# Patient Record
Sex: FEMALE | Race: BLACK OR AFRICAN AMERICAN
[De-identification: names, ages, dates, MRNs, and addresses within clinical notes are randomized per-mention and may not be internally consistent; named-entity substitution may affect disease eponyms.]

---

## 2018-04-18 NOTE — RAD
INDICATION:  Vaginal bleeding for 2 weeks.



COMPARISON:  There are no prior studies available for comparison.



TECHNIQUE:  Multiple real-time transvaginal images of the pelvis were obtained.



FINDINGS: The uterus is retroverted and normal in size and shape.  The uterus measured 6.6

x 3.5 x 4.3 cm.  The endometrial echo measured 0.9 cm in thickness.



The right ovary measured 5.7 x 4.8 x 4.6 cm.  The left ovary measured 2.9 x 2.2 x 1.9 cm.

There is vascular flow within both ovaries. There is a 3.9 x 4.1 x 3.7 cm simple appearing

right ovarian cyst.



There is a small amount of free intraperitoneal fluid in the cul-de-sac.



IMPRESSION:  2.9 CM RIGHT OVARIAN CYST LIKELY REPRESENTING A PHYSIOLOGIC CYST.

## 2018-04-18 NOTE — ED
GI/ HPI





- HPI Summary


HPI Summary: 


18 female presents to ED with complaints of "having my period for ~14 days". 

States she has been bleeding on and off. States she has had irregular cycle in 

the past however does not recall ever having it last as long as this. PMHx 

significant for PCOS. She does not take any medication, no birth control. 

Denies any abdominal pain, urinary symptoms or genitalia symptoms. No concern 

for vaginal discharge, itching, infection or STD concern. Denies concern for 

pregnancy. No fever/chills, nausea of vomiting. Denies lightheadedness, 

decreased appetite or other symptoms. No medications. Normal bowel movements. 

States she soaks through a ultra tampon over 4-5 hours.


 





- History of Current Complaint


Chief Complaint: EDOBProblems


Time Seen by Provider: 04/18/18 18:44


Stated Complaint: VAGINAL BLEEDING


Hx Obtained From: Patient


Onset/Duration: Started Weeks Ago - 2, Still Present


Timing: Constant


Severity: Mild


Current Severity: Mild


Vaginal Bleeding Description: Brownish-Red


Number of Pads per Hour: 1


Pain Intensity: 0


Location of Pain: None - Physical


Pain Characteristics: Other: - none


Associated Signs and Symptoms: Positive: Negative


Additional Signs & Symptoms: Positive: Vaginal Bleeding


Aggravating Factor(s): Nothing


Alleviating Factor(s): Nothing





- Allergy/Home Medications


Allergies/Adverse Reactions: 


 Allergies











Allergy/AdvReac Type Severity Reaction Status Date / Time


 


metformin Allergy  Unknown Verified 04/18/18 19:09





   Reaction  





   Details  














PMH/Surg Hx/FS Hx/Imm Hx


Endocrine/Hematology History: 


   Denies: Hx Anticoagulant Therapy, Hx Diabetes


Cardiovascular History: 


   Denies: Hx Hypertension


Respiratory History: 


   Denies: Hx Asthma


Psychiatric History: 


   Denies: Hx Eating Disorder





- Surgical History


Surgery Procedure, Year, and Place: none





- Immunization History


Immunizations Up to Date: Yes


Infectious Disease History: No


Infectious Disease History: 


   Denies: Traveled Outside the [place] in Last 30 Days





- Family History


Known Family History: Positive: None





- Social History


Alcohol Use: None


Hx Substance Use: Yes


Substance Use Type: Reports: Marijuana


Hx Tobacco Use: No


Smoking Status (MU): Never Smoked Tobacco





Review of Systems


Constitutional: Negative


Cardiovascular: Negative


Respiratory: Negative


Gastrointestinal: Negative


Positive: see HPI, other - vaginal bleeding


Musculoskeletal: Negative


Neurological: Negative


All Other Systems Reviewed And Are Negative: Yes





Physical Exam


Triage Information Reviewed: Yes


Vital Signs On Initial Exam: 


 Initial Vitals











Temp Pulse Resp BP Pulse Ox


 


 98.5 F   95   18   124/108   99 


 


 04/18/18 16:01  04/18/18 16:01  04/18/18 16:01  04/18/18 16:01  04/18/18 16:01








/90


Vital Signs Reviewed: Yes


Appearance: Positive: Well-Appearing, No Pain Distress, Well-Nourished


Skin: Positive: Warm, Skin Color Reflects Adequate Perfusion, Dry.  Negative: 

Cold, Numb, Cyanosis @, Pale, Erythema @


Head/Face: Positive: Normal Head/Face Inspection


Eyes: Positive: Conjunctiva Clear


ENT: Positive: Hearing grossly normal


Neck: Positive: Supple


Respiratory/Lung Sounds: Positive: Clear to Auscultation, Breath Sounds 

Present.  Negative: Rales, Rhonchi, Wheezes


Cardiovascular: Positive: Normal, RRR, Pulses are Symmetrical in both Upper and 

Lower Extremities.  Negative: Murmur, Rub


Abdomen Description: Positive: Nontender, No Organomegaly, Soft.  Negative: 

Bruit, CVA Tenderness (R), CVA Tenderness (L), Distended, Guarding, McBurney's 

Point Tenderness, Peritoneal Signs


Bowel Sounds: Positive: Present


Pelvic Exam: Positive: External Exam Normal - per patient, deferred exam, 

Active Bleeding


Musculoskeletal: Positive: Normal, Strength/ROM Intact


Neurological: Positive: Normal, Sensory/Motor Intact, Alert, Oriented to Person 

Place, Time, Normal Gait





Diagnostics





- Vital Signs


 Vital Signs











  Temp Pulse Resp BP Pulse Ox


 


 04/18/18 16:01  98.5 F  95  18  124/108  99














- Laboratory


Result Diagrams: 


 04/18/18 19:10





 04/18/18 19:10


Lab Statement: Any lab studies that have been ordered have been reviewed, and 

results considered in the medical decision making process.





- Ultrasound


  ** No standard instances


Ultrasound Interpretation: Positive (See Comments) - 2.9 CM RIGHT OVARIAN CYST 

LIKELY REPRESENTING A PHYSIOLOGIC CYST.


Ultrasound Interpretation Completed By: Radiologist





Re-Evaluation





- Re-Evaluation


  ** First Eval


Re-Evaluation Time: 20:00


Change: Improved - feeling fine and asymptomatic updated on any imaging and lab 

results agrees with plan and ready to be discharged





GIGU Course/Dx





- Course


Course Of Treatment: Labs obtained and unremarkable.  HCG obtained and 

negative.  Patient deferred pelvic exam.  Appears to be experiencing 

menorrhagia.  Ultrasound obtained showing right ovarian cyst without 

complication.  Follow-up with OB/GYN.  Did explain oral contraceptives 

sometimes can help irregular cycles.  Did also discuss complications of PCOS and

/or ovarian cyst.  Patient however does not want to take birth control.  

Increase fluid intake and eat sufficient amounts of iron.  Aware worsening 

signs and symptoms to watch out for.  No other concerns at this time.  Normal 

physical exam and vital signs otherwise.  Ibuprofen/Tylenol as needed for any 

discomfort due to right ovarian cyst.





- Diagnoses


Differential Diagnoses - Female: Other - Menorrhagia, vaginal bleeding


Provider Diagnoses: 


 Menorrhagia with irregular cycle, Ovarian cyst








Discharge





- Sign-Out/Discharge


Documenting (check all that apply): Discharge





- Discharge Plan


Condition: Good


Disposition: HOME


Patient Education Materials:  Ovarian Cyst (ED), Polycystic Ovarian Syndrome (ED

), Menorrhagia (ED)


Referrals: 


Toshia SUÁREZ,Becca GARNICA [Primary Care Provider] - 


Additional Instructions: 


Increase fluid intake.


Eat lots of iron containing foods such as spinach and leafy greens.


Follow up with OBGYN tomorrow, call to make an appointment.


Any new or worsening symptoms please seek medical attention as we discussed.





- Billing Disposition and Condition


Condition: GOOD


Disposition: HOME

## 2018-08-31 NOTE — ED
EXAM DESCRIPTION:

Shoulder,Left 2 or More Views



CLINICAL HISTORY:

80 years Female, fall with pain



COMPARISON:

None.



FINDINGS:

Two views of the left shoulder show no acute fracture or

malalignment. Degenerative changes are noted at the glenohumeral

joint including prominent osteophyte formation along the inferior

medial aspect of the left humeral head. The left AC joint is

unremarkable. Postoperative changes in the cervical spine. No

suspicious radiopaque foreign body or soft tissue gas.



IMPRESSION:

Degenerative changes, but no acute left shoulder abnormality.



Electronically signed by:  Dante Reddy MD  8/31/2018 11:08 AM CDT

Workstation: [id number] Psychiatric Complaint





- HPI Summary


HPI Summary: 





Per patient she feels like she is showing signs of a sociopath. Denies SI or 

HI. States she may be bipolar like her sister.  She continues to state she 

thinks she has something wrong with her thyroid as she "just doesn't feel right.

"  She fails to elaborate stating "its such a long story" and would like to 

speak with the psychiatrist about receiving help.  Denies self harm, depression

, anxiety, SI or HI.  She has been otherwise healthy and takes no medications.  

Denies ETOH .  Endorses marijuana use.  Denies previous history with psych.





- History Of Current Complaint


Chief Complaint: EDMentalHealth


Time Seen by Provider: 12/09/17 15:15


Hx Obtained From: Patient


Pregnant?: No


Onset/Duration: Gradual Onset


Timing: Constant


Severity Initially: Moderate


Severity Currently: Moderate


Character: Frustrated


Aggravating Factor(s): Nothing


Alleviating Factor(s): Nothing


Associated Signs And Symptoms: Positive: Confused, Paranoid Behavior





- Risk Factor(s)


Completed Suicide Risk Factors: Negative





PMH/Surg Hx/FS Hx/Imm Hx


Previously Healthy: Yes





- Immunization History


Hx Pertussis Vaccination: No


Immunizations Up to Date: Unable to Obtain/Confirm


Infectious Disease History: No


Infectious Disease History: 


   Denies: Traveled Outside the [place] in Last 30 Days





- Social History


Occupation: Employed Part-time


Lives: Dormitory/Roommates


Alcohol Use: None


Hx Substance Use: Yes


Substance Use Type: Reports: Marijuana


Hx Tobacco Use: No


Smoking Status (MU): Never Smoked Tobacco


Do You Chew or Dip Tobacco: No





Review of Systems


Constitutional: Negative


Negative: Fever, Chills, Fatigue


Eyes: Negative


Cardiovascular: Negative


Respiratory: Negative


Genitourinary: Negative


Positive: no symptoms reported, see HPI


Musculoskeletal: Negative


Skin: Negative


Neurological: Negative


Positive: Other - confused


All Other Systems Reviewed And Are Negative: Yes





Physical Exam


Triage Information Reviewed: Yes


Vital Signs On Initial Exam: 


 Initial Vitals











Temp Pulse Resp BP Pulse Ox


 


 97.5 F   80   20   140/69   97 


 


 12/09/17 15:12  12/09/17 15:12  12/09/17 15:12  12/09/17 15:12  12/09/17 15:12











Vital Signs Reviewed: Yes


Appearance: Positive: Well-Appearing, Well-Nourished


Skin: Positive: Warm, Skin Color Reflects Adequate Perfusion


Head/Face: Positive: Normal Head/Face Inspection


Eyes: Positive: EOMI, LYNDSAY, Conjunctiva Clear


Neck: Positive: Supple, Nontender, No Lymphadenopathy


Respiratory/Lung Sounds: Positive: Clear to Auscultation, Breath Sounds Present


Cardiovascular: Positive: Normal, RRR, Pulses are Symmetrical in both Upper and 

Lower Extremities


Musculoskeletal: Positive: Normal, Strength/ROM Intact


Neurological: Positive: Speech Normal


Psychiatric: Positive: Affect/Mood Appropriate


AVPU Assessment: Alert





Diagnostics





- Vital Signs


 Vital Signs











  Temp Pulse Resp BP Pulse Ox


 


 12/09/17 15:12  97.5 F  80  20  140/69  97














- Laboratory


Result Diagrams: 


 12/09/17 16:35





 12/09/17 16:35


Lab Statement: Any lab studies that have been ordered have been reviewed, and 

results considered in the medical decision making process.





Course/Dx





- Course


Course Of Treatment: Patient evaluated for feeling "off" lately.  Denies drug 

or ETOH use.  Denies physical pain.  She is cleared for MHU.  Dr. Lyon 

discharges patient with cannibus abuse.





- Differential Dx/Clinical Impression


Differential Diagnosis/HQI/PQRI: Positive: Acute Psychosis, Anxiety


Provider Diagnosis: 


 Cannabis use disorder, mild, abuse








Discharge





- Discharge Plan


Condition: Stable


Disposition: HOME


Patient Education Materials:  Mood Disorders (ED), Cannabis Abuse  (ED)


Referrals: 


Toshia SUÁREZ,Becca GARNICA [Primary Care Provider] -

## 2019-01-30 ENCOUNTER — HOSPITAL ENCOUNTER (EMERGENCY)
Dept: HOSPITAL 25 - ED | Age: 19
Discharge: HOME | End: 2019-01-30
Payer: COMMERCIAL

## 2019-01-30 VITALS — SYSTOLIC BLOOD PRESSURE: 115 MMHG | DIASTOLIC BLOOD PRESSURE: 60 MMHG

## 2019-01-30 DIAGNOSIS — R11.10: Primary | ICD-10-CM

## 2019-01-30 DIAGNOSIS — R10.9: ICD-10-CM

## 2019-01-30 LAB
ALBUMIN SERPL BCG-MCNC: 4.6 G/DL (ref 3.2–5.2)
ALBUMIN/GLOB SERPL: 1.3 {RATIO} (ref 1–3)
ALP SERPL-CCNC: 63 U/L (ref 34–104)
ALT SERPL W P-5'-P-CCNC: 12 U/L (ref 7–52)
ANION GAP SERPL CALC-SCNC: 9 MMOL/L (ref 2–11)
AST SERPL-CCNC: 17 U/L (ref 13–39)
BASOPHILS # BLD AUTO: 0.1 10^3/UL (ref 0–0.2)
BUN SERPL-MCNC: 7 MG/DL (ref 6–24)
BUN/CREAT SERPL: 8.5 (ref 8–20)
CALCIUM SERPL-MCNC: 9.7 MG/DL (ref 8.6–10.3)
CHLORIDE SERPL-SCNC: 105 MMOL/L (ref 101–111)
EOSINOPHIL # BLD AUTO: 0 10^3/UL (ref 0–0.6)
GLOBULIN SER CALC-MCNC: 3.5 G/DL (ref 2–4)
GLUCOSE SERPL-MCNC: 102 MG/DL (ref 70–100)
HCG SERPL QL: < 0.6 MIU/ML
HCO3 SERPL-SCNC: 24 MMOL/L (ref 22–32)
HCT VFR BLD AUTO: 39 % (ref 35–47)
HGB BLD-MCNC: 12.5 G/DL (ref 12–16)
LYMPHOCYTES # BLD AUTO: 1.3 10^3/UL (ref 1–4.8)
MCH RBC QN AUTO: 27 PG (ref 27–31)
MCHC RBC AUTO-ENTMCNC: 32 G/DL (ref 31–36)
MCV RBC AUTO: 82 FL (ref 80–97)
MONOCYTES # BLD AUTO: 0.7 10^3/UL (ref 0–0.8)
NEUTROPHILS # BLD AUTO: 7.9 10^3/UL (ref 1.5–7.7)
NRBC # BLD AUTO: 0 10^3/UL
NRBC BLD QL AUTO: 0.1
PLATELET # BLD AUTO: 225 10^3/UL (ref 150–450)
POTASSIUM SERPL-SCNC: 4.1 MMOL/L (ref 3.5–5)
PROT SERPL-MCNC: 8.1 G/DL (ref 6.4–8.9)
RBC # BLD AUTO: 4.72 10^6/UL (ref 4–5.4)
RBC UR QL AUTO: (no result)
SODIUM SERPL-SCNC: 138 MMOL/L (ref 135–145)
VIT C UR QL: (no result)
WBC # BLD AUTO: 10 10^3/UL (ref 3.5–10.8)
WBC UR QL AUTO: (no result)

## 2019-01-30 PROCEDURE — 87086 URINE CULTURE/COLONY COUNT: CPT

## 2019-01-30 PROCEDURE — 81003 URINALYSIS AUTO W/O SCOPE: CPT

## 2019-01-30 PROCEDURE — 86140 C-REACTIVE PROTEIN: CPT

## 2019-01-30 PROCEDURE — 74018 RADEX ABDOMEN 1 VIEW: CPT

## 2019-01-30 PROCEDURE — 99282 EMERGENCY DEPT VISIT SF MDM: CPT

## 2019-01-30 PROCEDURE — 36415 COLL VENOUS BLD VENIPUNCTURE: CPT

## 2019-01-30 PROCEDURE — 83690 ASSAY OF LIPASE: CPT

## 2019-01-30 PROCEDURE — 84702 CHORIONIC GONADOTROPIN TEST: CPT

## 2019-01-30 PROCEDURE — 83605 ASSAY OF LACTIC ACID: CPT

## 2019-01-30 PROCEDURE — 85025 COMPLETE CBC W/AUTO DIFF WBC: CPT

## 2019-01-30 PROCEDURE — 81015 MICROSCOPIC EXAM OF URINE: CPT

## 2019-01-30 PROCEDURE — 80053 COMPREHEN METABOLIC PANEL: CPT

## 2019-01-30 NOTE — ED
GI/ HPI





- HPI Summary


HPI Summary: 


Patient is a 18 y/o F presenting to ED with complaints of vomiting, abdominal 

cramping, and chills since 2200 last night. She denies diarrhea, notes that she 

experiences cramping after standing up. In room, she denies nausea at present. 

On triage, pain is rated 3/10, nothing is noted to aggravate/alleviate Sx, it 

is noted that patient took pepto PTA but vomited it up. Home medications and 

allergies are reviewed. 








- History of Current Complaint


Chief Complaint: EDNauseaVomitDiarrh


Time Seen by Provider: 01/30/19 14:58


Stated Complaint: VOMITING


Hx Obtained From: Patient


Onset/Duration: Started Hours Ago - last night 2200, Still Present


Timing: Constant, Lasting Hours - last night 2200


Current Severity: Mild - 3/10


Pain Intensity: 3


Location of Pain: Diffuse


Associated Signs and Symptoms: Positive: Vomiting, Chills, Other: - abdominal 

cramping.  Negative: Nausea - at present, Diarrhea


Aggravating Factor(s): Movement, Movement


Alleviating Factor(s): Nothing





- Allergy/Home Medications


Allergies/Adverse Reactions: 


 Allergies











Allergy/AdvReac Type Severity Reaction Status Date / Time


 


metformin Allergy  Unknown Verified 01/30/19 14:45





   Reaction  





   Details  














PMH/Surg Hx/FS Hx/Imm Hx


Endocrine/Hematology History: 


   Denies: Hx Anticoagulant Therapy, Hx Diabetes


Cardiovascular History: 


   Denies: Hx Hypertension


Respiratory History: 


   Denies: Hx Asthma


Psychiatric History: 


   Denies: Hx Eating Disorder





- Surgical History


Surgery Procedure, Year, and Place: none


Infectious Disease History: No


Infectious Disease History: 


   Denies: Traveled Outside the [place] in Last 30 Days





- Family History


Known Family History: 


   Negative: Hypertension, Diabetes





- Social History


Alcohol Use: None


Hx Substance Use: Yes


Substance Use Type: Reports: None


Hx Tobacco Use: No


Smoking Status (MU): Never Smoked Tobacco





Review of Systems


Positive: Chills


Positive: Vomiting, Other - POSITIVE - ABDOMINAL CRAMPING .  Negative: Diarrhea

, Nausea - at present 


All Other Systems Reviewed And Are Negative: Yes





Physical Exam





- Summary


Physical Exam Summary: 


Appearance: Well appearing, no pain distress


Skin: warm, dry, reflects adequate perfusion


Head/face: normal


Eyes: EOMI, LYNDSAY


ENT: normal


Neck: supple, non-tender


Respiratory: CTA, breath sounds present


Cardiovascular: RRR, pulses symmetrical  


Abdomen: non-tender, soft


Musculoskeletal: normal, strength/ROM intact


Neuro: normal, sensory motor intact, A&Ox3








Triage Information Reviewed: Yes


Vital Signs On Initial Exam: 


 Initial Vitals











Temp Pulse Resp BP Pulse Ox


 


 98.6 F   59   18   122/75   98 


 


 01/30/19 14:42  01/30/19 14:42  01/30/19 14:42  01/30/19 14:42  01/30/19 14:42











Vital Signs Reviewed: Yes





Diagnostics





- Vital Signs


 Vital Signs











  Temp Pulse Resp BP Pulse Ox


 


 01/30/19 14:42  98.6 F  59  18  122/75  98














- Laboratory


Lab Results: 


 Lab Results











  01/30/19 01/30/19 01/30/19 Range/Units





  16:10 16:10 16:10 


 


WBC  10.0    (3.5-10.8)  10^3/ul


 


RBC  4.72    (4.00-5.40)  10^6/ul


 


Hgb  12.5    (12.0-16.0)  g/dl


 


Hct  39    (35-47)  %


 


MCV  82    (80-97)  fL


 


MCH  27    (27-31)  pg


 


MCHC  32    (31-36)  g/dl


 


RDW  16 H    (10.5-15)  %


 


Plt Count  225    (150-450)  10^3/ul


 


MPV  8.2    (7.4-10.4)  fL


 


Neut % (Auto)  78.9    %


 


Lymph % (Auto)  13.4    %


 


Mono % (Auto)  7.2    %


 


Eos % (Auto)  0    %


 


Baso % (Auto)  0.5    %


 


Absolute Neuts (auto)  7.9 H    (1.5-7.7)  10^3/ul


 


Absolute Lymphs (auto)  1.3    (1.0-4.8)  10^3/ul


 


Absolute Monos (auto)  0.7    (0-0.8)  10^3/ul


 


Absolute Eos (auto)  0    (0-0.6)  10^3/ul


 


Absolute Basos (auto)  0.1    (0-0.2)  10^3/ul


 


Absolute Nucleated RBC  0    10^3/ul


 


Nucleated RBC %  0.1    


 


Sodium   138   (135-145)  mmol/L


 


Potassium   4.1   (3.5-5.0)  mmol/L


 


Chloride   105   (101-111)  mmol/L


 


Carbon Dioxide   24   (22-32)  mmol/L


 


Anion Gap   9   (2-11)  mmol/L


 


BUN   7   (6-24)  mg/dL


 


Creatinine   0.82   (0.51-0.95)  mg/dL


 


Est GFR ( Amer)   106.5   (>60)  


 


Est GFR (Non-Af Amer)   88.0   (>60)  


 


BUN/Creatinine Ratio   8.5   (8-20)  


 


Glucose   102 H   ()  mg/dL


 


Lactic Acid    < 0.3 L  (0.5-2.0)  mmol/L


 


Calcium   9.7   (8.6-10.3)  mg/dL


 


Total Bilirubin   0.40   (0.2-1.0)  mg/dL


 


AST   17   (13-39)  U/L


 


ALT   12   (7-52)  U/L


 


Alkaline Phosphatase   63   ()  U/L


 


C-Reactive Protein   1.67   (<8.01)  mg/L


 


Total Protein   8.1   (6.4-8.9)  g/dL


 


Albumin   4.6   (3.2-5.2)  g/dL


 


Globulin   3.5   (2-4)  g/dL


 


Albumin/Globulin Ratio   1.3   (1-3)  


 


Lipase   13   (11.0-82.0)  U/L


 


Beta HCG, Quant   < 0.60   mIU/mL











Result Diagrams: 


 01/30/19 16:10





 01/30/19 16:10


Lab Statement: Any lab studies that have been ordered have been reviewed, and 

results considered in the medical decision making process.





- Radiology


  ** ABDOMEN X-RAY


Radiology Interpretation Completed By: Radiologist


Summary of Radiographic Findings: ABDOMEN X-RAY IMPRESSION:Normal KUB. THIS 

REPORT WAS REVIEWED BY ED PHYSICIAN.





GIGU Course/Dx





- Course


Course Of Treatment: Patient is a 18 y/o F presenting to ED with complaints of 

vomiting, abdominal cramping, and chills since 2200 last night. She denies 

diarrhea, notes that she experiences cramping after standing up. In room, she 

denies nausea at present.  Physical exam is normal.  Bloodwork, UA is received. 

During ED course, patient received Zofran 4 mg PO.  ABDOMEN X-RAY IMPRESSION:

Normal KUB.  Patient will be discharged to home and follow up with PCP in three 

days, she is agreeable with this plan.





- Diagnoses


Differential Diagnoses - Female: Vomiting


Provider Diagnoses: 


 Vomiting








Discharge





- Sign-Out/Discharge


Documenting (check all that apply): Patient Departure - discharge 


Patient Received Moderate/Deep Sedation with Procedure: No - NO PROCEDURES DONE 





- Discharge Plan


Condition: Stable


Disposition: HOME


Prescriptions: 


Ondansetron ODT TAB* [Zofran 4 MG Odt TAB*] 4 mg PO Q8H PRN #15 tab.odt MDD 3


 PRN Reason: Vomiting


Patient Education Materials:  Acute Nausea and Vomiting (ED)


Forms:  *Work Release


Referrals: 


[facility] [Outside] - 3 Days


Additional Instructions: 


RETURN TO ED WITH ANY NEW OR WORSENING SYMPTOMS. FOLLOW UP WITH PRIMARY CARE 

PHYSICIAN WITHIN THREE DAYS. 





- Billing Disposition and Condition


Condition: STABLE


Disposition: Home





- Attestation Statements


Document Initiated by Scribe: Yes


Documenting Scribe: KALYN CLARKE 


Provider For Whom David is Documenting (Include Credential): GERSON BAR MD


Scribe Attestation: 


KALYN TAVAREZ scribed for GERSON BAR MD on 01/30/19 at 1836. 


Scribe Documentation Reviewed: Yes


Provider Attestation: 


The documentation as recorded by the KALYN gomez  accurately reflects 

the service I personally performed and the decisions made by me, GERSON BAR MD


Status of Scribe Document: Viewed